# Patient Record
Sex: FEMALE | Race: WHITE | Employment: FULL TIME | URBAN - METROPOLITAN AREA
[De-identification: names, ages, dates, MRNs, and addresses within clinical notes are randomized per-mention and may not be internally consistent; named-entity substitution may affect disease eponyms.]

---

## 2020-02-04 ENCOUNTER — HOSPITAL ENCOUNTER (EMERGENCY)
Facility: CLINIC | Age: 65
Discharge: HOME | End: 2020-02-05
Attending: INTERNAL MEDICINE | Admitting: INTERNAL MEDICINE
Payer: COMMERCIAL

## 2020-02-04 VITALS
BODY MASS INDEX: 21.69 KG/M2 | TEMPERATURE: 98 F | RESPIRATION RATE: 18 BRPM | HEART RATE: 98 BPM | SYSTOLIC BLOOD PRESSURE: 167 MMHG | OXYGEN SATURATION: 97 % | DIASTOLIC BLOOD PRESSURE: 100 MMHG | HEIGHT: 66 IN | WEIGHT: 135 LBS

## 2020-02-04 DIAGNOSIS — R21 ACUTE PURPURIC ERUPTION: ICD-10-CM

## 2020-02-04 LAB
ALBUMIN SERPL-MCNC: 3.6 G/DL (ref 3.4–5)
ALBUMIN UR-MCNC: NEGATIVE MG/DL
ALP SERPL-CCNC: 58 U/L (ref 40–150)
ALT SERPL W P-5'-P-CCNC: 15 U/L (ref 0–50)
ANION GAP SERPL CALCULATED.3IONS-SCNC: 8 MMOL/L (ref 3–14)
APPEARANCE UR: CLEAR
APTT PPP: 33 SEC (ref 22–37)
AST SERPL W P-5'-P-CCNC: 17 U/L (ref 0–45)
BASOPHILS # BLD AUTO: 0.1 10E9/L (ref 0–0.2)
BASOPHILS NFR BLD AUTO: 0.4 %
BILIRUB SERPL-MCNC: 1 MG/DL (ref 0.2–1.3)
BILIRUB UR QL STRIP: NEGATIVE
BUN SERPL-MCNC: 16 MG/DL (ref 7–30)
CALCIUM SERPL-MCNC: 8.4 MG/DL (ref 8.5–10.1)
CHLORIDE SERPL-SCNC: 105 MMOL/L (ref 94–109)
CO2 SERPL-SCNC: 25 MMOL/L (ref 20–32)
COLOR UR AUTO: ABNORMAL
CREAT SERPL-MCNC: 0.87 MG/DL (ref 0.52–1.04)
CRP SERPL-MCNC: 39.3 MG/L (ref 0–8)
DIFFERENTIAL METHOD BLD: ABNORMAL
EOSINOPHIL # BLD AUTO: 0.3 10E9/L (ref 0–0.7)
EOSINOPHIL NFR BLD AUTO: 0.9 %
ERYTHROCYTE [DISTWIDTH] IN BLOOD BY AUTOMATED COUNT: 13.3 % (ref 10–15)
ERYTHROCYTE [SEDIMENTATION RATE] IN BLOOD BY WESTERGREN METHOD: 12 MM/H (ref 0–30)
GFR SERPL CREATININE-BSD FRML MDRD: 70 ML/MIN/{1.73_M2}
GLUCOSE SERPL-MCNC: 104 MG/DL (ref 70–99)
GLUCOSE UR STRIP-MCNC: NEGATIVE MG/DL
HCT VFR BLD AUTO: 44.6 % (ref 35–47)
HGB BLD-MCNC: 14.4 G/DL (ref 11.7–15.7)
HGB UR QL STRIP: ABNORMAL
IMM GRANULOCYTES # BLD: 0.1 10E9/L (ref 0–0.4)
IMM GRANULOCYTES NFR BLD: 0.2 %
INR PPP: 1.07 (ref 0.86–1.14)
KETONES UR STRIP-MCNC: 20 MG/DL
LEUKOCYTE ESTERASE UR QL STRIP: NEGATIVE
LYMPHOCYTES # BLD AUTO: 21.6 10E9/L (ref 0.8–5.3)
LYMPHOCYTES NFR BLD AUTO: 74.6 %
MCH RBC QN AUTO: 32.1 PG (ref 26.5–33)
MCHC RBC AUTO-ENTMCNC: 32.3 G/DL (ref 31.5–36.5)
MCV RBC AUTO: 99 FL (ref 78–100)
MONOCYTES # BLD AUTO: 1 10E9/L (ref 0–1.3)
MONOCYTES NFR BLD AUTO: 3.5 %
MUCOUS THREADS #/AREA URNS LPF: PRESENT /LPF
NEUTROPHILS # BLD AUTO: 5.9 10E9/L (ref 1.6–8.3)
NEUTROPHILS NFR BLD AUTO: 20.4 %
NITRATE UR QL: NEGATIVE
NRBC # BLD AUTO: 0 10*3/UL
NRBC BLD AUTO-RTO: 0 /100
NT-PROBNP SERPL-MCNC: 137 PG/ML (ref 0–900)
PH UR STRIP: 5 PH (ref 5–7)
PLATELET # BLD AUTO: 191 10E9/L (ref 150–450)
PLATELET # BLD EST: ABNORMAL 10*3/UL
POTASSIUM SERPL-SCNC: 3.1 MMOL/L (ref 3.4–5.3)
PROT SERPL-MCNC: 7.7 G/DL (ref 6.8–8.8)
RBC # BLD AUTO: 4.49 10E12/L (ref 3.8–5.2)
RBC #/AREA URNS AUTO: <1 /HPF (ref 0–2)
RBC MORPH BLD: ABNORMAL
SODIUM SERPL-SCNC: 138 MMOL/L (ref 133–144)
SOURCE: ABNORMAL
SP GR UR STRIP: 1.01 (ref 1–1.03)
TROPONIN I SERPL-MCNC: <0.015 UG/L (ref 0–0.04)
UROBILINOGEN UR STRIP-MCNC: NORMAL MG/DL (ref 0–2)
WBC # BLD AUTO: 29 10E9/L (ref 4–11)
WBC #/AREA URNS AUTO: 0 /HPF (ref 0–5)

## 2020-02-04 PROCEDURE — 86140 C-REACTIVE PROTEIN: CPT | Performed by: INTERNAL MEDICINE

## 2020-02-04 PROCEDURE — 85610 PROTHROMBIN TIME: CPT | Performed by: INTERNAL MEDICINE

## 2020-02-04 PROCEDURE — 99284 EMERGENCY DEPT VISIT MOD MDM: CPT

## 2020-02-04 PROCEDURE — 80053 COMPREHEN METABOLIC PANEL: CPT | Performed by: INTERNAL MEDICINE

## 2020-02-04 PROCEDURE — 85652 RBC SED RATE AUTOMATED: CPT | Performed by: INTERNAL MEDICINE

## 2020-02-04 PROCEDURE — 85025 COMPLETE CBC W/AUTO DIFF WBC: CPT | Performed by: INTERNAL MEDICINE

## 2020-02-04 PROCEDURE — 84484 ASSAY OF TROPONIN QUANT: CPT | Performed by: INTERNAL MEDICINE

## 2020-02-04 PROCEDURE — 93005 ELECTROCARDIOGRAM TRACING: CPT

## 2020-02-04 PROCEDURE — 81001 URINALYSIS AUTO W/SCOPE: CPT | Performed by: INTERNAL MEDICINE

## 2020-02-04 PROCEDURE — 85730 THROMBOPLASTIN TIME PARTIAL: CPT | Performed by: INTERNAL MEDICINE

## 2020-02-04 PROCEDURE — 83880 ASSAY OF NATRIURETIC PEPTIDE: CPT | Performed by: INTERNAL MEDICINE

## 2020-02-04 ASSESSMENT — MIFFLIN-ST. JEOR: SCORE: 1174.11

## 2020-02-04 ASSESSMENT — ENCOUNTER SYMPTOMS
FEVER: 0
ACTIVITY CHANGE: 0

## 2020-02-04 NOTE — ED AVS SNAPSHOT
[facility] Emergency Department  [address]  [facility] [address]  Phone:  [phone]  Fax:  [phone]                                    Sharmila Tinajero   MRN: [id number]    Department:  [facility] Emergency Department   Date of Visit:  2/4/2020           After Visit Summary Signature Page    I have received my discharge instructions, and my questions have been answered. I have discussed any challenges I see with this plan with the nurse or doctor.    ..........................................................................................................................................  Patient/Patient Representative Signature      ..........................................................................................................................................  Patient Representative Print Name and Relationship to Patient    ..................................................               ................................................  Date                                   Time    ..........................................................................................................................................  Reviewed by Signature/Title    ...................................................              ..............................................  Date                                               Time          22EPIC Rev 08/18

## 2020-02-04 NOTE — LETTER
February 5, 2020      To Whom It May Concern:      Sharmila Tinajero was seen in our Emergency Department today, 02/05/20.  I expect her condition to improve over the next 1-3 days.  She may return to work/school when improved.    Sincerely,        Khalida CALVERT RN

## 2020-02-05 LAB — INTERPRETATION ECG - MUSE: NORMAL

## 2020-02-05 NOTE — ED PROVIDER NOTES
"  History     Chief Complaint:    Leg Swelling and Rash      HPI   Sharmila Tinajero is a 64 year old female referred from clinic for furtrher evaluation of leg rash. Present for aout 1.5 weeks, yesterday spread to thighs. Weeps when scratched. No pain. No other bleeding.  She does have a history of chronic lymphocytic leukemia which is been treated with observation.  She has not had any fever.  She denies any rash.  No lesions of the eyes or mouth.  The ankles are mildly swollen now.    Allergies:  Sulfa drugs    Medications:    The patient is not currently taking any prescribed medications.       Past Medical History:    CLL, no requiring treatment    Past Surgical History:    History reviewed. No pertinent past surgical history.     Family History:    History reviewed. No pertinent family history.      Social History:  The patient came to the emergency department by herself..  Marital Status:   [2]     Review of Systems   Constitutional: Negative for activity change and fever.   Cardiovascular: Positive for leg swelling.   All other systems reviewed and are negative.      Physical Exam   First Vitals:  BP: (!) 203/115  Pulse: 97  Temp: 98  F (36.7  C)  Resp: 18  Height: 167.6 cm (5' 6\")  Weight: 61.2 kg (135 lb)  SpO2: 98 %      Physical Exam  Constitutional:       Comments: Pleasant and cooperative   HENT:      Right Ear: Tympanic membrane normal.      Left Ear: Tympanic membrane normal.      Mouth/Throat:      Pharynx: No posterior oropharyngeal erythema.   Eyes:      Conjunctiva/sclera: Conjunctivae normal.   Neck:      Musculoskeletal: Neck supple.   Cardiovascular:      Rate and Rhythm: Normal rate and regular rhythm.      Heart sounds: Normal heart sounds.   Pulmonary:      Effort: Pulmonary effort is normal.      Breath sounds: Normal breath sounds.   Abdominal:      General: Bowel sounds are normal. There is no distension.      Palpations: Abdomen is soft.      Tenderness: There is no abdominal " tenderness. There is no guarding or rebound.   Musculoskeletal: Normal range of motion.   Skin:     General: Skin is warm and dry.      Comments: Purpuric rash on legs. In ankle/foot area, coalescent with bullae. No lesions arms or trunk.    Neurological:      Mental Status: She is alert.                     Emergency Department Course   ECG:    Normal sinus rhythm with normal axis and intervals, no significant abnormalities of the ST segments or T waves, no significant change from previous EKG of May 4, 2011.      Laboratory:    CBC: WBC 29.0 (H), HGB 14.4,    INR: 1.07   Partial thromboplastin time: 33  CMP: Potassium 3.1 (L), Glucose 104 (H), Calcium 8.4 (L) o/w WNL. (Creatinine 0.87)   Erythrocyte sedimentation rate auto: 12  CRP inflammation: 39.3 (H)  Troponin (Collected 1859 ): <0.015   BNP: 137     UA: Straw and Clear. Urineketon 20, Urine Blood Trace, mucous Urine Present o/w WNL         Impression & Plan      Medical Decision Making:    Sharmila Tinajero is a 64 year old female referred from clinic for further evaluation of a rash.  She proves to have a purpuric rash which is restricted to the lower extremities.  With her history of CLL I considered thrombocytopenia or coagulopathy but there is no evidence of this on laboratory testing.  I considered an infectious etiology.  She does not have fever or localizing evidence of infection.  She does have leukocytosis but this proves to be largely lymphocytic consistent with her known CLL.  With her subacute clinical course I do not think this is consistent with meningococcemia.  There was apparently concern about Fenton-Tab syndrome from the clinic but the distribution of this is wrong, there is no mucous membrane or ocular involvement, and no preceding medication to suggest this.  I attempted to contact dermatology on call but was unable to reach the physician.  I discussed with the patient I think her evidence shows that she is stable enough we can  have her follow-up tomorrow.  I have messaged the dermatologist who is listed on call tonight in hopes she can help facilitate prompt follow-up in clinic tomorrow.  Patient indicates her insurance may not cover the [facility] in which case she should contact a primary care physician or other dermatologist directly.  Return here if worse or other problems.    Diagnosis:    ICD-10-CM    1. Acute purpuric eruption R21        Disposition:  discharged to home    Discharge Medications:  New Prescriptions    No medications on file       Pearl Andino MD  2/4/2020   [facility] EMERGENCY DEPARTMENT       Pearl Andino MD  02/05/20 0124

## 2020-02-05 NOTE — ED TRIAGE NOTES
Patient presents with complaints of rash for the past 1.5 weeks. Patient states it has been getting worse. Patient states that her legs are starting to yesterday. Denies any chest pain or SOB.  ABC intact without need for intervention at this time.

## 2020-02-05 NOTE — DISCHARGE INSTRUCTIONS
The cause of your rash may be something related to this Henoch-Schonlein purpura, and these instructions will be helpful to you    Call dermatology tomorrow to be seen. Let them know we think you have Purpura, and may have vasculitis.

## 2020-02-07 ENCOUNTER — DOCUMENTATION ONLY (OUTPATIENT)
Dept: CARE COORDINATION | Facility: CLINIC | Age: 65
End: 2020-02-07

## 2020-02-07 ENCOUNTER — TELEPHONE (OUTPATIENT)
Dept: DERMATOLOGY | Facility: CLINIC | Age: 65
End: 2020-02-07

## 2020-02-07 NOTE — TELEPHONE ENCOUNTER
I called Sharmila and offered her an appointment for 1030 today with Dr. Jackson. Sharmila declined stating she needs to call her brother for a ride . I gave her the phone number to call back and get scheduled.

## 2020-02-12 ENCOUNTER — OFFICE VISIT (OUTPATIENT)
Dept: DERMATOLOGY | Facility: CLINIC | Age: 65
End: 2020-02-12
Payer: COMMERCIAL

## 2020-02-12 DIAGNOSIS — R31.9 HEMATURIA, UNSPECIFIED TYPE: ICD-10-CM

## 2020-02-12 DIAGNOSIS — R21 RASH: ICD-10-CM

## 2020-02-12 DIAGNOSIS — R21 RASH: Primary | ICD-10-CM

## 2020-02-12 LAB
ALBUMIN UR-MCNC: NEGATIVE MG/DL
APPEARANCE UR: CLEAR
BILIRUB UR QL STRIP: NEGATIVE
COLOR UR AUTO: YELLOW
GLUCOSE UR STRIP-MCNC: NEGATIVE MG/DL
HGB UR QL STRIP: ABNORMAL
KETONES UR STRIP-MCNC: NEGATIVE MG/DL
LEUKOCYTE ESTERASE UR QL STRIP: NEGATIVE
MUCOUS THREADS #/AREA URNS LPF: PRESENT /LPF
NITRATE UR QL: NEGATIVE
PH UR STRIP: 7 PH (ref 5–7)
RBC #/AREA URNS AUTO: 9 /HPF (ref 0–2)
SOURCE: ABNORMAL
SP GR UR STRIP: 1.01 (ref 1–1.03)
SQUAMOUS #/AREA URNS AUTO: <1 /HPF (ref 0–1)
UROBILINOGEN UR STRIP-MCNC: 0 MG/DL (ref 0–2)
WBC #/AREA URNS AUTO: 1 /HPF (ref 0–5)

## 2020-02-12 RX ORDER — PREDNISONE 10 MG/1
TABLET ORAL
Qty: 42 TABLET | Refills: 0 | Status: SHIPPED | OUTPATIENT
Start: 2020-02-12 | End: 2020-03-02

## 2020-02-12 ASSESSMENT — PAIN SCALES - GENERAL: PAINLEVEL: MODERATE PAIN (5)

## 2020-02-12 NOTE — NURSING NOTE
Dermatology Rooming Note    Sharmila Tinajero's goals for this visit include:   Chief Complaint   Patient presents with     Derm Problem     Rash on lower legs.     Ange Aguirre LPN

## 2020-02-12 NOTE — PROGRESS NOTES
[facility] Dermatology Note      Dermatology Problem List:  1. Rash  - s/p bx 2/12/2020  - prednisone taper (initiated 2/12/2020)     Encounter Date: Feb 12, 2020    CC:  Chief Complaint   Patient presents with     Derm Problem     Rash on lower legs.     History of Present Illness:  Ms. Sharmila Tinajero is a 64 year old female, with a history of CLL, who presents today in self referral for a rash evaluation.     Today she reports a rash on her lower legs. This first started about 3 weeks ago. It first started on her shins and then started spreading to her feet and upper thighs. Endorses edema to her legs and feet. Notes it has stopped spreading and has started to fade on her upper thighs. Denies involvement of her abdomen. She went to the ED for this on 2/4/2020, where she was diagnosed with a purpuric eruption and told to follow up with dermatology. No new prescriptions at this visit. This rash is very painful to her, and has caused her to miss multiple days of work. 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 She has tried soaking her feet and legs in Epsom salts, without improvement. Denies any of the spots to be itchy today. Few to no new spots in the last few days per pt. She admits the rash has started to fade and is no longer on her thighs. She has less swelling in her feet.     Prior to the eruption, she develop a cough/ URI and states this has almost resolved. Denies associated fevers. She has a history of CLL, diagnosed over 10 years ago. She has been followed by [facility] but has not followed up in over 2 years.     Otherwise she is feeling well, without additional skin concerns. Notes she has had cold symptoms for the last month.     Past Medical History:   There is no problem list on file for this patient.    No past medical history on file.  No past surgical history on file.   Chronic lymphocytic leukemia   Hypertension    Social History:  Patient reports that  she has never smoked. She has never used smokeless tobacco.   She is a   Walks a lot at work    Family History:  No family history on file.    Medications:  No current outpatient medications on file.       Allergies   Allergen Reactions     Sulfa Drugs Rash       Review of Systems:  -Constitutional: Otherwise feeling well today, in usual state of health.  -Skin: As above in HPI. No additional skin concerns.    Physical exam:  Vitals: There were no vitals taken for this visit.  GEN: This is a well developed, well-nourished female in no acute distress, in a pleasant mood.   [place] skin type I-II.    SKIN: Examination includes of the face, chest, back, abdomen, upper extremities and lower extremities, including hand and feet was performed. Significant for:     -There are numerous purpuric macules and papules with a few scattered bullae on the bl lower extremities. There are some pink plaques with overlying crust to the lateral lower legs and lateral ankles and dorsal feet.  - Edema noted to bl feet   - Abdomen clear of macules and papules   -No other lesions of concern on areas examined.     Impression/Plan:  1. Eruption of uncertain behavior on the lower legs. Will perform biopsy today and start on course of prednisone to alleviate remaining inflammation. Pt hx of CLL. 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  -  Pt additionally staffed by Dr. Sahu. Discussed biopsy. Recommend repeat U/A today.   - Labs today: U/A . This revealed 9 RBC and Moderate blood. Sharmila was referred to nephrology.   Previous other labs from ER visit 2/4/20 reveal WBC 29 (for which she states is typically for her CLL, no records available), CRP 39.3. Otherwise Troponin, BNP, INR, PTT, CMP and ESR were unremarkable.   - Punch biopsy:  After discussion of benefits and risks including but not limited to bleeding/bruising, pain/swelling, infection, scar, incomplete removal, nerve damage/numbness,  recurrence, and non-diagnostic biopsy, written consent, verbal consent and photographs were obtained. Time-out was performed. The area was cleaned with isopropyl alcohol. 0.5mL of 1% lidocaine was injected to obtain adequate anesthesia of the lesion on the left medial knee. Two 4 mm punch biopsies were performed and one was sent for DIF. 4-0 ethilon sutures were utilized to approximate the epidermal edges. White petroleum jelly/Vaseline and a bandage was applied to the wound. Explicit verbal and written wound care instructions were provided. The patient left the Dermatology Clinic in good condition. The patient was counseled to follow up for suture removal in approximately 10-14 days.  -Recommend a tapering course of prednisone to help with patients acute symptom:  30 mg daily x 7 days, then 20 mg x 7 days, then 10 mg x 7 days. Discussed medication side effects including increased appetite, weight gain, edema, hypertension, elevated blood sugar levels, changes in mood, osteopenia/osteoporosis, Cushing's disease and risks associated with abruptly stopping the medication.Take medication with food and wait 30 minutes before laying down.    - Recommend follow up with hematologist (has not seen her physician at [facility] for over 2 years) and PCP, for history of hypertension (out of medication)    Follow-up in 2 weeks, earlier for new or changing lesions.     Staff Involved:  Scribe/Staff    Scribe Disclosure:   I, Khalida Wynn, am serving as a scribe to document services personally performed by Kita Navarro PA-C, based on data collection and the provider's statements to me.    Provider Disclosure:   The documentation recorded by the scribe accurately reflects the services I personally performed and the decisions made by me.    All risks, benefits and alternatives were discussed with patient.  Patient is in agreement and understands the assessment and plan.  All questions were answered.  Sun Screen Education  was given.   Return to Clinic in 2 weeks or sooner as needed.   Kita Navarro PA-C   [facility]

## 2020-02-12 NOTE — PATIENT INSTRUCTIONS
Preventive Care:    Breast Cancer Screening: During our visit today, we discussed that it is recommended you receive breast cancer screening. Please call or make an appointment with your primary care provider to discuss this with them. You may also call the [facility] scheduling line ([phone]) to set up a mammography appointment at the [facility] the [facility].    Preventive Care:    Colorectal Cancer Screening: During our visit today, we discussed that it is recommended you receive colorectal cancer screening. Please call or make an appointment with your primary care provider to discuss this. You may also call the [facility] scheduling line ([phone]) to set up a colonoscopy appointment.    Wound Care After a Biopsy    What is a skin biopsy?  A skin biopsy allows the doctor to examine a very small piece of tissue under the microscope to determine the diagnosis and the best treatment for the skin condition. A local anesthetic (numbing medicine)  is injected with a very small needle into the skin area to be tested. A small piece of skin is taken from the area. Sometimes a suture (stitch) is used.     What are the risks of a skin biopsy?  I will experience scar, bleeding, swelling, pain, crusting and redness. I may experience incomplete removal or recurrence. Risks of this procedure are excessive bleeding, bruising, infection, nerve damage, numbness, thick (hypertrophic or keloidal) scar and non-diagnostic biopsy.    How should I care for my wound for the first 24 hours?    Keep the wound dry and covered for 24 hours    If it bleeds, hold direct pressure on the area for 15 minutes. If bleeding does not stop then go to the emergency room    Avoid strenuous exercise the first 1-2 days or as your doctor instructs you    How should I care for the wound after 24 hours?    After 24 hours, remove the bandage    You may bathe or shower as normal    If you had a scalp biopsy, you can  shampoo as usual and can use shower water to clean the biopsy site daily    Clean the wound twice a day with gentle soap and water    Do not scrub, be gentle    Apply white petroleum/Vaseline after cleaning the wound with a cotton swab or a clean finger, and keep the site covered with a Bandaid /bandage. Bandages are not necessary with a scalp biopsy    If you are unable to cover the site with a Bandaid /bandage, re-apply ointment 2-3 times a day to keep the site moist. Moisture will help with healing    Avoid strenuous activity for first 1-2 days    Avoid lakes, rivers, pools, and oceans until the stitches are removed or the site is healed    How do I clean my wound?    Wash hands thoroughly with soap or use hand  before all wound care    Clean the wound with gentle soap and water    Apply white petroleum/Vaseline  to wound after it is clean    Replace the Bandaid /bandage to keep the wound covered for the first few days or as instructed by your doctor    If you had a scalp biopsy, warm shower water to the area on a daily basis should suffice    What should I use to clean my wound?     Cotton-tipped applicators (Qtips )    White petroleum jelly (Vaseline ). Use a clean new container and use Q-tips to apply.    Bandaids   as needed    Gentle soap     How should I care for my wound long term?    Do not get your wound dirty    Keep up with wound care for one week or until the area is healed.    A small scab will form and fall off by itself when the area is completely healed. The area will be red and will become pink in color as it heals. Sun protection is very important for how your scar will turn out. Sunscreen with an SPF 30 or greater is recommended once the area is healed.    If you have stitches, stitches need to be removed in 10-14 days. You may return to our clinic for this or you may have it done locally at your doctor s office.    You should have some soreness but it should be mild and slowly go away  over several days. Talk to your doctor about using tylenol for pain,    When should I call my doctor?  If you have increased:     Pain or swelling    Pus or drainage (clear or slightly yellow drainage is ok)    Temperature over 100F    Spreading redness or warmth around wound    When will I hear about my results?  The biopsy results can take 2-3 weeks to come back. The clinic will call you with the results, send you a [employer] message, or have you schedule a follow-up clinic or phone time to discuss the results. Contact our clinics if you do not hear from us in 3 weeks.     Who should I call with questions?    [facility]: [phone]     [facility]: [phone]    For urgent needs outside of business hours call the [facility] at [phone] and ask for the dermatology resident on call

## 2020-02-12 NOTE — LETTER
2/12/2020       RE: Sharmila Tinajero  [address]  [facility] [address]     Dear Colleague,    Thank you for referring your patient, Sharmila Tinajero, to the [facility] DERMATOLOGY at [facility]. Please see a copy of my visit note below.    [facility] Dermatology Note      Dermatology Problem List:  1. Rash  - s/p bx 2/12/2020  - prednisone taper (initiated 2/12/2020)     Encounter Date: Feb 12, 2020    CC:  Chief Complaint   Patient presents with     Derm Problem     Rash on lower legs.     History of Present Illness:  Ms. Sharmila Tinajero is a 64 year old female, with a history of CLL, who presents today in self referral for a rash evaluation.     Today she reports a rash on her lower legs. This first started about 3 weeks ago. It first started on her shins and then started spreading to her feet and upper thighs. Endorses edema to her legs and feet. Notes it has stopped spreading and has started to fade on her upper thighs. Denies involvement of her abdomen. She went to the ED for this on 2/4/2020, where she was diagnosed with a purpuric eruption and told to follow up with dermatology. No new prescriptions at this visit. This rash is very painful to her, and has caused her to miss multiple days of work. 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 She has tried soaking her feet and legs in Epsom salts, without improvement. Denies any of the spots to be itchy today. Few to no new spots in the last few days per pt. She admits the rash has started to fade and is no longer on her thighs. She has less swelling in her feet.     Prior to the eruption, she develop a cough/ URI and states this has almost resolved. Denies associated fevers. She has a history of CLL, diagnosed over 10 years ago. She has been followed by [facility] but has not followed up in over 2 years.     Otherwise she is feeling well, without additional skin concerns. Notes she  has had cold symptoms for the last month.     Past Medical History:   There is no problem list on file for this patient.    No past medical history on file.  No past surgical history on file.   Chronic lymphocytic leukemia   Hypertension    Social History:  Patient reports that she has never smoked. She has never used smokeless tobacco.   She is a   Walks a lot at work    Family History:  No family history on file.    Medications:  No current outpatient medications on file.       Allergies   Allergen Reactions     Sulfa Drugs Rash       Review of Systems:  -Constitutional: Otherwise feeling well today, in usual state of health.  -Skin: As above in HPI. No additional skin concerns.    Physical exam:  Vitals: There were no vitals taken for this visit.  GEN: This is a well developed, well-nourished female in no acute distress, in a pleasant mood.   [place] skin type I-II.    SKIN: Examination includes of the face, chest, back, abdomen, upper extremities and lower extremities, including hand and feet was performed. Significant for:     -There are numerous purpuric macules and papules with a few scattered bullae on the bl lower extremities. There are some pink plaques with overlying crust to the lateral lower legs and lateral ankles and dorsal feet.  - Edema noted to bl feet   - Abdomen clear of macules and papules   -No other lesions of concern on areas examined.     Impression/Plan:  1. Eruption of uncertain behavior on the lower legs. Will perform biopsy today and start on course of prednisone to alleviate remaining inflammation. Pt hx of CLL. 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  -  Pt additionally staffed by Dr. Sahu. Discussed biopsy. Recommend repeat U/A today.   - Labs today: U/A . This revealed 9 RBC and Moderate blood. Sharmila was referred to nephrology.   Previous other labs from ER visit 2/4/20 reveal WBC 29 (for which she states is typically for her CLL, no  records available), CRP 39.3. Otherwise Troponin, BNP, INR, PTT, CMP and ESR were unremarkable.   - Punch biopsy:  After discussion of benefits and risks including but not limited to bleeding/bruising, pain/swelling, infection, scar, incomplete removal, nerve damage/numbness, recurrence, and non-diagnostic biopsy, written consent, verbal consent and photographs were obtained. Time-out was performed. The area was cleaned with isopropyl alcohol. 0.5mL of 1% lidocaine was injected to obtain adequate anesthesia of the lesion on the left medial knee. Two 4 mm punch biopsies were performed and one was sent for DIF. 4-0 ethilon sutures were utilized to approximate the epidermal edges. White petroleum jelly/Vaseline and a bandage was applied to the wound. Explicit verbal and written wound care instructions were provided. The patient left the Dermatology Clinic in good condition. The patient was counseled to follow up for suture removal in approximately 10-14 days.  -Recommend a tapering course of prednisone to help with patients acute symptom:  30 mg daily x 7 days, then 20 mg x 7 days, then 10 mg x 7 days. Discussed medication side effects including increased appetite, weight gain, edema, hypertension, elevated blood sugar levels, changes in mood, osteopenia/osteoporosis, Cushing's disease and risks associated with abruptly stopping the medication.Take medication with food and wait 30 minutes before laying down.    - Recommend follow up with hematologist (has not seen her physician at [facility] for over 2 years) and PCP, for history of hypertension (out of medication)    Follow-up in 2 weeks, earlier for new or changing lesions.     Staff Involved:  Scribe/Staff    Scribe Disclosure:   Khalida PHIPPS, am serving as a scribe to document services personally performed by Kita Navarro PA-C, based on data collection and the provider's statements to me.    Provider Disclosure:   The documentation recorded by the  scribe accurately reflects the services I personally performed and the decisions made by me.    All risks, benefits and alternatives were discussed with patient.  Patient is in agreement and understands the assessment and plan.  All questions were answered.  Sun Screen Education was given.   Return to Clinic in 2 weeks or sooner as needed.   Kita Navarro PA-C   [facility] Dermatology [facility]

## 2020-02-17 LAB — COPATH REPORT: NORMAL

## 2020-03-02 ENCOUNTER — OFFICE VISIT (OUTPATIENT)
Dept: DERMATOLOGY | Facility: CLINIC | Age: 65
End: 2020-03-02
Payer: COMMERCIAL

## 2020-03-02 DIAGNOSIS — M31.0 LEUKOCYTOCLASTIC VASCULITIS (H): Primary | ICD-10-CM

## 2020-03-02 ASSESSMENT — PAIN SCALES - GENERAL: PAINLEVEL: NO PAIN (0)

## 2020-03-02 NOTE — LETTER
"3/2/2020       RE: Sharmila Tinajero  [address]  [facility] [address]     Dear Colleague,    Thank you for referring your patient, Sharmila Tinajero, to the [facility] DERMATOLOGY at [facility]. Please see a copy of my visit note below.    [facility] Dermatology Note      Dermatology Problem List:  1. 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     History of Present Illness:  Ms. Sharmila Tinajero is a 64 year old female, with a history of CLL, who presents today as a follow up for a rash on the bilateral lower legs. She was last seen in the dermatology clinic on 2/12/20 when sh started a prednisone taper 30 mg daily x 7 days, then 20 mg x 7 days, then 10 mg x 7 days. A punch biopsy was taken from the left medial knee and returned with the comment: \"Given the clinical presentation, the DIF findings are consistent with a late/resolving presentation of leukocytoclastic vasculitis.\"     She notes that the rash appears much improved today. There is still some residual discoloration in the sites of previous eruptions. The rash is no longer painful for her and she no longer has any concerns walking. After the last visit, the rash spread up towards her abdomen once she increased her activity level, but this has significantly improved on the prednisone. Her symptoms have not returned or worsened. She is in the last week of her taper and currently will take 10 mg prednisone for 4-5 more days. Denies changing, bleeding, painful, or non healing lesions anywhere on the body.    Otherwise she is feeling well, without additional skin concerns at this time.    Past Medical History:   There is no problem list on file for this " patient.    No past medical history on file.  No past surgical history on file.   Chronic lymphocytic leukemia   Hypertension    Social History:  Patient reports that she has never smoked. She has never used smokeless tobacco.   She is a   Walks a lot at work    Family History:  No family history on file.    Medications:  Current Outpatient Medications   Medication Sig Dispense Refill     predniSONE (DELTASONE) 10 MG tablet Take 30 mg daily with food x 7 days, then 20 mg daily x 7 days then 10 mg daily x 7 days. (Patient not taking: Reported on 3/2/2020) 42 tablet 0       Allergies   Allergen Reactions     Sulfa Drugs Rash       Review of Systems:  -Constitutional: Otherwise feeling well today, in usual state of health.  -Skin: As above in HPI. No additional skin concerns.    Physical exam:  Vitals: There were no vitals taken for this visit.  GEN: This is a well developed, well-nourished female in no acute distress, in a pleasant mood.   [place] skin type I-II.    SKIN: Examination includes of the face, chest, back, abdomen, upper extremities and lower extremities, including hand and feet was performed. Significant for:     - Faint pink macules on the lower legs/ankles  - No notable swelling to the lower/ankles   - No other lesions of concern on areas examined.     Impression/Plan:  1. 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   -  Punch Bx returned consistent with a late/resolving presentation of leukocytoclastic vasculitis  - Continue prednisone taper completing the last several days on 10 mg daily  - Continue following with nephrology through [facility] as previously scheduled. Pt had + 9 RBC and hematuria on urinalysis.  -Pt will f/u PCP this week, for her annual screenings and restart BP medications if needed.  -Pt encouraged oncology follow up.      Follow-up prn.    Staff Involved:  Scribe/Staff    Scribe Disclosure:   Ken PHIPPS, am serving  as a scribe to document services personally performed by Kita Navarro PA-C, based on data collection and the provider's statements to me.    Provider Disclosure:   The documentation recorded by the scribe accurately reflects the services I personally performed and the decisions made by me.    All risks, benefits and alternatives were discussed with patient.  Patient is in agreement and understands the assessment and plan.  All questions were answered.  Sun Screen Education was given.   Return to Clinic as needed.   Kita Navarro PA-C   [facility]       Again, thank you for allowing me to participate in the care of your patient.      Sincerely,    Kita Navarro PA-C

## 2020-03-02 NOTE — NURSING NOTE
Dermatology Rooming Note    Sharmila Tinajero's goals for this visit include:   Chief Complaint   Patient presents with     Derm Problem     Vasculitis follow up, Sharmila notes her skin is doing much better.     Ange Aguirre LPN

## 2020-03-02 NOTE — LETTER
Date:March 5, 2020      Patient was self referred, no letter generated. Do not send.        [facility] Information

## 2020-03-02 NOTE — PROGRESS NOTES
"[facility] Dermatology Note      Dermatology Problem List:  1. 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     History of Present Illness:  Ms. Sharmila Tinajero is a 64 year old female, with a history of CLL, who presents today as a follow up for a rash on the bilateral lower legs. She was last seen in the dermatology clinic on 2/12/20 when sh started a prednisone taper 30 mg daily x 7 days, then 20 mg x 7 days, then 10 mg x 7 days. A punch biopsy was taken from the left medial knee and returned with the comment: \"Given the clinical presentation, the DIF findings are consistent with a late/resolving presentation of leukocytoclastic vasculitis.\"     She notes that the rash appears much improved today. There is still some residual discoloration in the sites of previous eruptions. The rash is no longer painful for her and she no longer has any concerns walking. After the last visit, the rash spread up towards her abdomen once she increased her activity level, but this has significantly improved on the prednisone. Her symptoms have not returned or worsened. She is in the last week of her taper and currently will take 10 mg prednisone for 4-5 more days. Denies changing, bleeding, painful, or non healing lesions anywhere on the body.    Otherwise she is feeling well, without additional skin concerns at this time.    Past Medical History:   There is no problem list on file for this patient.    No past medical history on file.  No past surgical history on file.   Chronic lymphocytic leukemia   Hypertension    Social History:  Patient reports that she has never smoked. She has never used smokeless tobacco.   She is a   Walks a lot at " work    Family History:  No family history on file.    Medications:  Current Outpatient Medications   Medication Sig Dispense Refill     predniSONE (DELTASONE) 10 MG tablet Take 30 mg daily with food x 7 days, then 20 mg daily x 7 days then 10 mg daily x 7 days. (Patient not taking: Reported on 3/2/2020) 42 tablet 0       Allergies   Allergen Reactions     Sulfa Drugs Rash       Review of Systems:  -Constitutional: Otherwise feeling well today, in usual state of health.  -Skin: As above in HPI. No additional skin concerns.    Physical exam:  Vitals: There were no vitals taken for this visit.  GEN: This is a well developed, well-nourished female in no acute distress, in a pleasant mood.   [place] skin type I-II.    SKIN: Examination includes of the face, chest, back, abdomen, upper extremities and lower extremities, including hand and feet was performed. Significant for:     - Faint pink macules on the lower legs/ankles  - No notable swelling to the lower/ankles   - No other lesions of concern on areas examined.     Impression/Plan:  1. 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   -  Punch Bx returned consistent with a late/resolving presentation of leukocytoclastic vasculitis  - Continue prednisone taper completing the last several days on 10 mg daily  - Continue following with nephrology through [facility] as previously scheduled. Pt had + 9 RBC and hematuria on urinalysis.  -Pt will f/u PCP this week, for her annual screenings and restart BP medications if needed.  -Pt encouraged oncology follow up.      Follow-up prn.    Staff Involved:  Scribe/Staff    Scribe Disclosure:   MOISE, Ken Barth, am serving as a scribe to document services personally performed by Kita Navarro PA-C, based on data collection and the provider's statements to me.    Provider Disclosure:   The documentation recorded by the scribe accurately reflects the services I personally performed and the  decisions made by me.    All risks, benefits and alternatives were discussed with patient.  Patient is in agreement and understands the assessment and plan.  All questions were answered.  Sun Screen Education was given.   Return to Clinic as needed.   Kita Navarro PA-C   [facility]